# Patient Record
Sex: FEMALE | Race: WHITE | NOT HISPANIC OR LATINO | Employment: OTHER | URBAN - METROPOLITAN AREA
[De-identification: names, ages, dates, MRNs, and addresses within clinical notes are randomized per-mention and may not be internally consistent; named-entity substitution may affect disease eponyms.]

---

## 2020-02-12 ENCOUNTER — OFFICE VISIT (OUTPATIENT)
Dept: OPHTHALMOLOGY | Facility: CLINIC | Age: 65
End: 2020-02-12
Payer: MEDICARE

## 2020-02-12 DIAGNOSIS — Z98.890 S/P LASIK (LASER ASSISTED IN SITU KERATOMILEUSIS): ICD-10-CM

## 2020-02-12 DIAGNOSIS — H16.122 FILAMENTARY KERATITIS OF LEFT EYE: ICD-10-CM

## 2020-02-12 DIAGNOSIS — H16.002 CORNEAL EROSION OF LEFT EYE: Primary | ICD-10-CM

## 2020-02-12 PROCEDURE — 92004 PR EYE EXAM, NEW PATIENT,COMPREHESV: ICD-10-PCS | Mod: S$PBB,,, | Performed by: OPHTHALMOLOGY

## 2020-02-12 PROCEDURE — 99999 PR PBB SHADOW E&M-NEW PATIENT-LVL II: ICD-10-PCS | Mod: PBBFAC,,, | Performed by: OPHTHALMOLOGY

## 2020-02-12 PROCEDURE — 92004 COMPRE OPH EXAM NEW PT 1/>: CPT | Mod: S$PBB,,, | Performed by: OPHTHALMOLOGY

## 2020-02-12 PROCEDURE — 99999 PR PBB SHADOW E&M-NEW PATIENT-LVL II: CPT | Mod: PBBFAC,,, | Performed by: OPHTHALMOLOGY

## 2020-02-12 PROCEDURE — 99202 OFFICE O/P NEW SF 15 MIN: CPT | Mod: PBBFAC | Performed by: OPHTHALMOLOGY

## 2020-02-12 RX ORDER — ACETYLCYSTEINE 100 MG/ML
10 SOLUTION ORAL; RESPIRATORY (INHALATION) EVERY 4 HOURS
Qty: 10 ML | Refills: 3 | Status: SHIPPED | OUTPATIENT
Start: 2020-02-12 | End: 2020-03-13

## 2020-02-12 RX ORDER — OFLOXACIN 3 MG/ML
SOLUTION/ DROPS OPHTHALMIC
COMMUNITY
Start: 2020-02-11

## 2020-02-12 RX ORDER — PREDNISOLONE ACETATE 10 MG/ML
SUSPENSION/ DROPS OPHTHALMIC
COMMUNITY
Start: 2020-02-11

## 2020-02-12 NOTE — Clinical Note
February 12, 2020      MARVIN FRIEDMAN  [address] [facility] [address]           [facility] - Ophthalmology  [facility] [address]  Phone: [phone]  Fax: [phone]          Patient: Sindy Webster   MR Number: [id number]   YOB: 1955   Date of Visit: 2/12/2020       Dear Marvin Friedman:    Thank you for referring Sindy Webster to me for evaluation. Attached you will find relevant portions of my assessment and plan of care.    If you have questions, please do not hesitate to call me. I look forward to following Sindy Webster along with you.    Sincerely,    Nicole Hernandez MD    Enclosure  CC:  No Recipients    If you would like to receive this communication electronically, please contact [email or web address] or [phone] to request more information on [employer] access.    For providers and/or their staff who would like to refer a patient to [facility], please contact us through our one-stop-shop provider referral line, [facility] , at [phone].    If you feel you have received this communication in error or would no longer like to receive these types of communications, please e-mail [email or web address]

## 2020-02-12 NOTE — PROGRESS NOTES
HPI     Pt was referred by Dr Marvin Friedman for non healing corneal abrasion OS.  She states March or April 2019 she poked herself OS with a stick.    Eye Meds:   Ofloxacin QID OS  Pred Acetate BID  Systane xu qhs  Genteal Tears 6-10 times daily      Last edited by Joshua Horton on 2/12/2020  3:48 PM. (History)            Assessment /Plan     For exam results, see Encounter Report.    Corneal erosion of left eye    S/P LASIK (laser assisted in situ keratomileusis)    Filamentary keratitis of left eye      Use ATs, try Mucomyst 10%  May consider CTL at night or later, Excimer PTK over LASIK

## 2020-02-12 NOTE — LETTER
[facility] - Ophthalmology  Ophthalmology  [address]  [facility] [address]  Phone: [phone]  Fax: [phone]   February 12, 2020    GRECIA FRIEDMAN  [address] [place] [address]    Patient: Sindy Webster   MR Number: [id number]   YOB: 1955   Date of Visit: 2/12/2020       Dear Dr. Friedman :    Thank you for referring Sindy eWbster to me for evaluation. Here is my assessment and plan of care:    Corneal erosion of left eye    S/P LASIK (laser assisted in situ keratomileusis)    Filamentary keratitis of left eye    Use ATs, try Mucomyst 10%  May consider CTL use at night or later, Excimer PTK over LASIK     If you have questions, please do not hesitate to call me. I look forward to following Sindy Webster along with you.    Sincerely,        Nicole Hernandez MD       CC  No Recipients